# Patient Record
Sex: FEMALE | URBAN - METROPOLITAN AREA
[De-identification: names, ages, dates, MRNs, and addresses within clinical notes are randomized per-mention and may not be internally consistent; named-entity substitution may affect disease eponyms.]

---

## 2024-10-17 ENCOUNTER — APPOINTMENT (RX ONLY)
Dept: URBAN - METROPOLITAN AREA CLINIC 154 | Facility: CLINIC | Age: 24
Setting detail: DERMATOLOGY
End: 2024-10-17

## 2024-10-17 DIAGNOSIS — Z41.9 ENCOUNTER FOR PROCEDURE FOR PURPOSES OTHER THAN REMEDYING HEALTH STATE, UNSPECIFIED: ICD-10-CM

## 2024-10-17 PROCEDURE — ? COSMETIC CONSULTATION: PRP

## 2024-10-17 PROCEDURE — ? COSMETIC CONSULTATION - MICRO-NEEDLING

## 2024-10-18 ENCOUNTER — APPOINTMENT (RX ONLY)
Dept: URBAN - METROPOLITAN AREA CLINIC 154 | Facility: CLINIC | Age: 24
Setting detail: DERMATOLOGY
End: 2024-10-18

## 2024-10-18 DIAGNOSIS — Z41.9 ENCOUNTER FOR PROCEDURE FOR PURPOSES OTHER THAN REMEDYING HEALTH STATE, UNSPECIFIED: ICD-10-CM

## 2024-10-18 PROCEDURE — ? MICRONEEDLING

## 2024-10-18 NOTE — PROCEDURE: MICRONEEDLING
Detail Level: Zone
Location #4: nose
Length Of Topical Anesthesia Application (Optional): 30 minutes
Location #3: jaw/mouth
How Many Cc Of Bacteriostatic 0.9% Saline Were Added?: 0
Location #2: forehead
Depth In Mm (Location #1): 1.5
Post-Care Instructions: After the procedure, take precautions agains sun exposure. Do not apply sunscreen for 12 hours after the procedure. Do not apply make-up for 12 hours after the procedure. Avoid alcohol based toners for 10-14 days. After 2-3 days patients can return to their regular skin regimen.
Depth In Mm (Location #3): 1
Infusions (Optional): PRP
Topical Anesthesia?: 23% lidocaine, 7% tetracaine
Depth In Mm (Location #4): 0.25
Depth In Mm (Location #2): 0.5
Location #1: cheeks
Consent: Written consent obtained, risks reviewed including but not limited to pain, scarring, infection and incomplete improvement.  Patient understands the procedure is cosmetic in nature and will require out of pocket payment.

## 2024-11-13 ENCOUNTER — APPOINTMENT (RX ONLY)
Dept: URBAN - METROPOLITAN AREA CLINIC 154 | Facility: CLINIC | Age: 24
Setting detail: DERMATOLOGY
End: 2024-11-13

## 2024-11-13 DIAGNOSIS — Z41.9 ENCOUNTER FOR PROCEDURE FOR PURPOSES OTHER THAN REMEDYING HEALTH STATE, UNSPECIFIED: ICD-10-CM

## 2024-11-13 PROCEDURE — ? MICRONEEDLING

## 2024-11-13 ASSESSMENT — LOCATION ZONE DERM: LOCATION ZONE: FACE

## 2024-11-13 ASSESSMENT — LOCATION SIMPLE DESCRIPTION DERM: LOCATION SIMPLE: INFERIOR FOREHEAD

## 2024-11-13 ASSESSMENT — LOCATION DETAILED DESCRIPTION DERM: LOCATION DETAILED: INFERIOR MID FOREHEAD

## 2024-11-13 NOTE — PROCEDURE: MICRONEEDLING
Location #3: jaw/mouth
Depth In Mm (Location #1): 1.5
Detail Level: Zone
How Many Cc Of Bacteriostatic 0.9% Saline Were Added?: 0
Depth In Mm (Location #2): 0.25
Location #4: forehead
Length Of Topical Anesthesia Application (Optional): 30 minutes
Location #1: cheeks
Infusions (Optional): PRP
Consent: Written consent obtained, risks reviewed including but not limited to pain, scarring, infection and incomplete improvement.  Patient understands the procedure is cosmetic in nature and will require out of pocket payment.
Depth In Mm (Location #3): 1
Location #2: eyes/nose
Topical Anesthesia?: 23% lidocaine, 7% tetracaine
Depth In Mm (Location #4): 0.5
Post-Care Instructions: After the procedure, take precautions agains sun exposure. Do not apply sunscreen for 12 hours after the procedure. Do not apply make-up for 12 hours after the procedure. Avoid alcohol based toners for 10-14 days. After 2-3 days patients can return to their regular skin regimen.

## 2024-12-11 ENCOUNTER — APPOINTMENT (OUTPATIENT)
Dept: URBAN - METROPOLITAN AREA CLINIC 154 | Facility: CLINIC | Age: 24
Setting detail: DERMATOLOGY
End: 2024-12-11

## 2024-12-11 DIAGNOSIS — Z41.9 ENCOUNTER FOR PROCEDURE FOR PURPOSES OTHER THAN REMEDYING HEALTH STATE, UNSPECIFIED: ICD-10-CM

## 2024-12-11 PROCEDURE — ? MICRONEEDLING

## 2024-12-11 NOTE — PROCEDURE: MICRONEEDLING
Depth In Mm (Location #2): 0.25
Location #1: cheeks
Topical Anesthesia?: 23% lidocaine, 7% tetracaine
Treatment Number (Optional): 3
Depth In Mm (Location #3): 1
Consent: Written consent obtained, risks reviewed including but not limited to pain, scarring, infection and incomplete improvement.  Patient understands the procedure is cosmetic in nature and will require out of pocket payment.
Detail Level: Zone
Location #2: nose
Depth In Mm (Location #4): 0.5
Location #3: mouth/jaw
Depth In Mm (Location #1): 1.25
How Many Cc Of Bacteriostatic 0.9% Saline Were Added?: 0
Post-Care Instructions: After the procedure, take precautions agains sun exposure. Do not apply sunscreen for 12 hours after the procedure. Do not apply make-up for 12 hours after the procedure. Avoid alcohol based toners for 10-14 days. After 2-3 days patients can return to their regular skin regimen.
Length Of Topical Anesthesia Application (Optional): 30 minutes
Infusions (Optional): PRP
Location #4: forehead